# Patient Record
Sex: MALE | Race: WHITE | Employment: FULL TIME | URBAN - METROPOLITAN AREA
[De-identification: names, ages, dates, MRNs, and addresses within clinical notes are randomized per-mention and may not be internally consistent; named-entity substitution may affect disease eponyms.]

---

## 2019-02-08 ENCOUNTER — OFFICE VISIT (OUTPATIENT)
Dept: FAMILY MEDICINE CLINIC | Age: 24
End: 2019-02-08
Payer: COMMERCIAL

## 2019-02-08 VITALS
DIASTOLIC BLOOD PRESSURE: 88 MMHG | WEIGHT: 315 LBS | OXYGEN SATURATION: 96 % | HEART RATE: 78 BPM | HEIGHT: 70 IN | SYSTOLIC BLOOD PRESSURE: 138 MMHG | TEMPERATURE: 98.3 F | BODY MASS INDEX: 45.1 KG/M2

## 2019-02-08 DIAGNOSIS — G47.33 OSA (OBSTRUCTIVE SLEEP APNEA): Primary | ICD-10-CM

## 2019-02-08 PROCEDURE — 99213 OFFICE O/P EST LOW 20 MIN: CPT | Performed by: FAMILY MEDICINE

## 2019-02-08 ASSESSMENT — PATIENT HEALTH QUESTIONNAIRE - PHQ9
SUM OF ALL RESPONSES TO PHQ QUESTIONS 1-9: 0
SUM OF ALL RESPONSES TO PHQ QUESTIONS 1-9: 0
1. LITTLE INTEREST OR PLEASURE IN DOING THINGS: 0
2. FEELING DOWN, DEPRESSED OR HOPELESS: 0
SUM OF ALL RESPONSES TO PHQ9 QUESTIONS 1 & 2: 0

## 2019-08-08 ENCOUNTER — OFFICE VISIT (OUTPATIENT)
Dept: FAMILY MEDICINE CLINIC | Age: 24
End: 2019-08-08
Payer: COMMERCIAL

## 2019-08-08 ENCOUNTER — HOSPITAL ENCOUNTER (OUTPATIENT)
Age: 24
Discharge: HOME | End: 2019-08-10
Payer: COMMERCIAL

## 2019-08-08 VITALS
HEIGHT: 71 IN | SYSTOLIC BLOOD PRESSURE: 122 MMHG | HEART RATE: 76 BPM | DIASTOLIC BLOOD PRESSURE: 72 MMHG | BODY MASS INDEX: 42.28 KG/M2 | WEIGHT: 302 LBS | OXYGEN SATURATION: 98 % | TEMPERATURE: 98.7 F

## 2019-08-08 DIAGNOSIS — Z00.00 ENCOUNTER FOR WELL ADULT EXAM WITHOUT ABNORMAL FINDINGS: Primary | ICD-10-CM

## 2019-08-08 DIAGNOSIS — Z00.00 ENCOUNTER FOR WELL ADULT EXAM WITHOUT ABNORMAL FINDINGS: ICD-10-CM

## 2019-08-08 DIAGNOSIS — Z23 NEED FOR TDAP VACCINATION: ICD-10-CM

## 2019-08-08 LAB
ANION GAP SERPL CALCULATED.3IONS-SCNC: 16 MMOL/L (ref 7–16)
BASOPHILS ABSOLUTE: 0.04 E9/L (ref 0–0.2)
BASOPHILS RELATIVE PERCENT: 0.6 % (ref 0–2)
BUN BLDV-MCNC: 13 MG/DL (ref 6–20)
CALCIUM SERPL-MCNC: 10 MG/DL (ref 8.6–10.2)
CHLORIDE BLD-SCNC: 103 MMOL/L (ref 98–107)
CHOLESTEROL, TOTAL: 150 MG/DL (ref 0–199)
CO2: 23 MMOL/L (ref 22–29)
CREAT SERPL-MCNC: 0.9 MG/DL (ref 0.7–1.2)
EOSINOPHILS ABSOLUTE: 0.08 E9/L (ref 0.05–0.5)
EOSINOPHILS RELATIVE PERCENT: 1.1 % (ref 0–6)
GFR AFRICAN AMERICAN: >60
GFR NON-AFRICAN AMERICAN: >60 ML/MIN/1.73
GLUCOSE BLD-MCNC: 86 MG/DL (ref 74–99)
HCT VFR BLD CALC: 49.1 % (ref 37–54)
HDLC SERPL-MCNC: 33 MG/DL
HEMOGLOBIN: 16.1 G/DL (ref 12.5–16.5)
IMMATURE GRANULOCYTES #: 0.02 E9/L
IMMATURE GRANULOCYTES %: 0.3 % (ref 0–5)
LDL CHOLESTEROL CALCULATED: 96 MG/DL (ref 0–99)
LYMPHOCYTES ABSOLUTE: 1.59 E9/L (ref 1.5–4)
LYMPHOCYTES RELATIVE PERCENT: 21.9 % (ref 20–42)
MCH RBC QN AUTO: 29.5 PG (ref 26–35)
MCHC RBC AUTO-ENTMCNC: 32.8 % (ref 32–34.5)
MCV RBC AUTO: 89.9 FL (ref 80–99.9)
MONOCYTES ABSOLUTE: 0.7 E9/L (ref 0.1–0.95)
MONOCYTES RELATIVE PERCENT: 9.6 % (ref 2–12)
NEUTROPHILS ABSOLUTE: 4.84 E9/L (ref 1.8–7.3)
NEUTROPHILS RELATIVE PERCENT: 66.5 % (ref 43–80)
PDW BLD-RTO: 13.2 FL (ref 11.5–15)
PLATELET # BLD: 298 E9/L (ref 130–450)
PMV BLD AUTO: 8.8 FL (ref 7–12)
POTASSIUM SERPL-SCNC: 4.7 MMOL/L (ref 3.5–5)
RBC # BLD: 5.46 E12/L (ref 3.8–5.8)
SODIUM BLD-SCNC: 142 MMOL/L (ref 132–146)
TRIGL SERPL-MCNC: 107 MG/DL (ref 0–149)
VLDLC SERPL CALC-MCNC: 21 MG/DL
WBC # BLD: 7.3 E9/L (ref 4.5–11.5)

## 2019-08-08 PROCEDURE — 90715 TDAP VACCINE 7 YRS/> IM: CPT | Performed by: FAMILY MEDICINE

## 2019-08-08 PROCEDURE — 80048 BASIC METABOLIC PNL TOTAL CA: CPT

## 2019-08-08 PROCEDURE — 36415 COLL VENOUS BLD VENIPUNCTURE: CPT

## 2019-08-08 PROCEDURE — 85025 COMPLETE CBC W/AUTO DIFF WBC: CPT

## 2019-08-08 PROCEDURE — 80061 LIPID PANEL: CPT

## 2019-08-08 PROCEDURE — 99395 PREV VISIT EST AGE 18-39: CPT | Performed by: FAMILY MEDICINE

## 2019-08-08 PROCEDURE — 90471 IMMUNIZATION ADMIN: CPT | Performed by: FAMILY MEDICINE

## 2019-08-08 NOTE — PROGRESS NOTES
Tdap (age 10y-63y) IM ([facility])    As above  Patient counseled to follow up sooner or seek more acute care if symptoms worsening. Electronically signed by Geary Bence, MD on 8/9/2019    This note may have been created using dictation software.  Efforts were made to reduce grammatical or syntax errors, but some may persist.

## 2020-11-06 ENCOUNTER — OFFICE VISIT (OUTPATIENT)
Dept: FAMILY MEDICINE CLINIC | Age: 25
End: 2020-11-06
Payer: COMMERCIAL

## 2020-11-06 ENCOUNTER — TELEPHONE (OUTPATIENT)
Dept: ADMINISTRATIVE | Age: 25
End: 2020-11-06

## 2020-11-06 ENCOUNTER — NURSE ONLY (OUTPATIENT)
Dept: PRIMARY CARE CLINIC | Age: 25
End: 2020-11-06

## 2020-11-06 VITALS
TEMPERATURE: 97.8 F | DIASTOLIC BLOOD PRESSURE: 80 MMHG | OXYGEN SATURATION: 96 % | SYSTOLIC BLOOD PRESSURE: 132 MMHG | HEIGHT: 71 IN | WEIGHT: 315 LBS | BODY MASS INDEX: 44.1 KG/M2 | HEART RATE: 75 BPM

## 2020-11-06 DIAGNOSIS — J02.9 ACUTE PHARYNGITIS, UNSPECIFIED ETIOLOGY: ICD-10-CM

## 2020-11-06 PROCEDURE — 87880 STREP A ASSAY W/OPTIC: CPT | Performed by: FAMILY MEDICINE

## 2020-11-06 PROCEDURE — 99213 OFFICE O/P EST LOW 20 MIN: CPT | Performed by: FAMILY MEDICINE

## 2020-11-06 RX ORDER — PREDNISONE 50 MG/1
50 TABLET ORAL DAILY
Qty: 5 TABLET | Refills: 0 | Status: SHIPPED
Start: 2020-11-06 | End: 2021-06-26 | Stop reason: SDUPTHER

## 2020-11-06 RX ORDER — AMOXICILLIN AND CLAVULANATE POTASSIUM 875; 125 MG/1; MG/1
1 TABLET, FILM COATED ORAL 2 TIMES DAILY WITH MEALS
Qty: 20 TABLET | Refills: 0 | Status: SHIPPED | OUTPATIENT
Start: 2020-11-06 | End: 2020-11-16

## 2020-11-06 ASSESSMENT — PATIENT HEALTH QUESTIONNAIRE - PHQ9
2. FEELING DOWN, DEPRESSED OR HOPELESS: 0
SUM OF ALL RESPONSES TO PHQ9 QUESTIONS 1 & 2: 0
SUM OF ALL RESPONSES TO PHQ QUESTIONS 1-9: 0
1. LITTLE INTEREST OR PLEASURE IN DOING THINGS: 0
SUM OF ALL RESPONSES TO PHQ QUESTIONS 1-9: 0
SUM OF ALL RESPONSES TO PHQ QUESTIONS 1-9: 0

## 2020-11-06 NOTE — PROGRESS NOTES
[facility]  Office Progress Note - Dr. Amy Ontiveros  11/6/20    Chief Complaint   Patient presents with    Pharyngitis     Onset 3 days        HPI: sore throat   Hx severe strep and pharyngitis episodes in past  Required hospitalization and also intubation. Did alright last winter  Sx for 3 days, wanted to get taken care of sooner than later  No fevers, cough, ear pain, abd pain, rhinorrhea. On exam has BL tonsil enlargement but left side worse than right. No LAD BL. +PND. +exudates. Normal heart and lungs. hasnt tried tx with anything. Needs covid 19 negative to RTW.   ______________________________________________________  Family History   Problem Relation Age of Onset    Coronary Art Dis Maternal Grandfather        Past Surgical History:   Procedure Laterality Date    HAND SURGERY         Social History     Tobacco Use    Smoking status: Never Smoker    Smokeless tobacco: Never Used   Substance Use Topics    Alcohol use: Yes     Alcohol/week: 0.0 standard drinks     Comment: social    Drug use: No     ______________________________________________________  ROS: POSITIVE:sore throat   Otherwise:  No CP, No palpitations,   No sob, No cough,   No abd pain, No heartburn,   No headaches, No vision changes, No hearing changes,   No tingling, No numbness, No weakness,   No bowel changes, No hematochezia, No melena,  No bladder changes, No hematuria  No skin rashes, No skin lesions. No polyuria, polydipsia, polyphagia. Stable mood. ROS otherwise negative unless as listed in HPI.     Chart reviewed and updated where appropriate for PMH, Fam, and Soc Hx.  _______________________________________________________  Physical Exam   /80 (Site: Left Upper Arm, Position: Sitting, Cuff Size: Large Adult)   Pulse 75   Temp 97.8 °F (36.6 °C) (Temporal)   Ht 5' 11\" (1.803 m)   Wt (!) 358 lb (162.4 kg)   SpO2 96%   BMI 49.93 kg/m²   Wt Readings from Last 3 Encounters: 11/06/20 (!) 358 lb (162.4 kg)   08/08/19 (!) 302 lb (137 kg)   02/08/19 (!) 337 lb (152.9 kg)       Constitutional:    He is oriented to person, place, and time. He appears well-developed and well-nourished. HENT:    Right Ear: normal pinna, normal canal, normal TM. Left Ear: normal pinna, normal canal, normal TM. Nose: Nose normal.    Mouth/Throat: Oropharynx is clear and moist. Erythema and as in HPI. Eyes:    Conjunctivae are normal.    Pupils are equal, round, and reactive to light. EOMI. Neck:    Normal range of motion. No thyromegaly or nodules noted. No bruit. Cardiovascular:    Normal rate, regular rhythm and normal heart sounds. No murmur. No gallop and no friction rub. Pulmonary/Chest:    Effort normal and breath sounds normal.    No wheezes. No rales or rhonchi.    ________________________________________________________  No current outpatient medications on file prior to visit. No current facility-administered medications on file prior to visit. Patient Active Problem List   Diagnosis Code    Seasonal allergies J30.2    Obesity peds (BMI >=95 percentile) E66.9, Z68.54     ________________________________________________________  Assessment / Mario Seng was seen today for pharyngitis. Diagnoses and all orders for this visit:    Acute pharyngitis, unspecified etiology  -     POCT rapid strep A  -     COVID-19 Ambulatory; Future  -     amoxicillin-clavulanate (AUGMENTIN) 875-125 MG per tablet; Take 1 tablet by mouth 2 times daily (with meals) for 10 days  -     predniSONE (DELTASONE) 50 MG tablet; Take 1 tablet by mouth daily for 5 days    Strep neg in office. I am going to elect to treat given severity of previous episodes. Discussed peritonsillar abscesses and worsening signs that might suggest one as the source. Will need re-eval if Sx worsening through the weekend given historical severity.       Return if symptoms worsen or fail to improve. Patient counseled to follow up sooner or seek more acute care if symptoms worsening or not improving according to plan. .     Electronically signed by Quentin Carmen MD on 11/6/2020    This note may have been created using dictation software.  Efforts were made to reduce grammatical or syntax errors, but some may persist.

## 2020-11-08 LAB
SARS-COV-2: NOT DETECTED
SOURCE: NORMAL

## 2021-06-25 ENCOUNTER — OFFICE VISIT (OUTPATIENT)
Dept: FAMILY MEDICINE CLINIC | Age: 26
End: 2021-06-25
Payer: COMMERCIAL

## 2021-06-25 ENCOUNTER — TELEPHONE (OUTPATIENT)
Dept: FAMILY MEDICINE CLINIC | Age: 26
End: 2021-06-25

## 2021-06-25 VITALS
BODY MASS INDEX: 42.66 KG/M2 | HEART RATE: 61 BPM | HEIGHT: 72 IN | OXYGEN SATURATION: 98 % | WEIGHT: 315 LBS | TEMPERATURE: 97.1 F

## 2021-06-25 DIAGNOSIS — J02.9 SORE THROAT: Primary | ICD-10-CM

## 2021-06-25 LAB — S PYO AG THROAT QL: NORMAL

## 2021-06-25 PROCEDURE — 99213 OFFICE O/P EST LOW 20 MIN: CPT | Performed by: INTERNAL MEDICINE

## 2021-06-25 PROCEDURE — 87880 STREP A ASSAY W/OPTIC: CPT | Performed by: INTERNAL MEDICINE

## 2021-06-25 RX ORDER — AMOXICILLIN 875 MG/1
875 TABLET, COATED ORAL 2 TIMES DAILY
Qty: 20 TABLET | Refills: 0 | Status: SHIPPED | OUTPATIENT
Start: 2021-06-25 | End: 2021-07-05

## 2021-06-25 ASSESSMENT — ENCOUNTER SYMPTOMS
SINUS PRESSURE: 0
COUGH: 0
SORE THROAT: 1
SHORTNESS OF BREATH: 0
SINUS PAIN: 0
EYES NEGATIVE: 1
WHEEZING: 0
CHEST TIGHTNESS: 0

## 2021-06-25 NOTE — PROGRESS NOTES
[address] [place]     21  Letha Cure : 1995 Sex: male  Age: 26 y.o. Chief Complaint   Patient presents with    Pharyngitis     onset yesterday; woke up and it is worse this morning; left side is worse than the right; glands feel swollen       HPI    Patient presents to express care today complaining of sore throat primarily left side which started yesterday morning. States he has gotten these in the past and has had strep on a few occasions. Denies any fever or chills. States he gets this a couple times a year. Denies any other upper respiratory tract complaints including earache or sinus drainage. Denies cough. Denies shortness of breath. Denies trouble swallowing. Review of Systems   Constitutional: Negative for chills and fever. HENT: Positive for sore throat. Negative for congestion, ear pain, postnasal drip, sinus pressure and sinus pain. Eyes: Negative. Respiratory: Negative for cough, chest tightness, shortness of breath and wheezing. Cardiovascular: Negative for chest pain. REST OF PERTINENT ROS GONE OVER AND WAS NEGATIVE.              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     Past Surgical History:   Procedure Laterality Date    HAND SURGERY       Family History   Problem Relation Age of Onset    Coronary Art Dis Maternal Grandfather      Social History     Socioeconomic History    Marital status: Single     Spouse name: Not on file    Number of children: Not on file    Years of education: Not on file    Highest education level: Not on file   Occupational History    Not on file   Tobacco Use    Smoking status: Never Smoker    Smokeless tobacco: Never Used   Vaping Use  Vaping Use: Never used   Substance and Sexual Activity    Alcohol use: Yes     Alcohol/week: 0.0 standard drinks     Comment: social    Drug use: No    Sexual activity: Not on file   Other Topics Concern    Not on file   Social History Narrative    Not on file     Social Determinants of Health     Financial Resource Strain:     Difficulty of Paying Living Expenses:    Food Insecurity:     Worried About Running Out of Food in the Last Year:     [address] in the Last Year:    Transportation Needs:     Lack of Transportation (Medical):  Lack of Transportation (Non-Medical):    Physical Activity:     Days of Exercise per Week:     Minutes of Exercise per Session:    Stress:     Feeling of Stress :    Social Connections:     Frequency of Communication with Friends and Family:     Frequency of Social Gatherings with Friends and Family:     Attends [employer]oration[employer] Services:     Active Member of Clubs or Organizations:     Attends Club or Organization Meetings:     Marital Status:    Intimate Partner Violence:     Fear of Current or Ex-Partner:     Emotionally Abused:     Physically Abused:     Sexually Abused:        Vitals:    06/25/21 1242   Pulse: 61   Temp: 97.1 °F (36.2 °C)   TempSrc: Temporal   SpO2: 98%   Weight: (!) 330 lb (149.7 kg)   Height: 6' (1.829 m)       Physical Exam  Vitals and nursing note reviewed. Constitutional:       General: He is not in acute distress. Appearance: He is well-developed. HENT:      Head: Normocephalic and atraumatic. Right Ear: Tympanic membrane, ear canal and external ear normal.      Left Ear: Tympanic membrane, ear canal and external ear normal.      Nose: Nose normal.      Mouth/Throat:      Mouth: Mucous membranes are moist.      Pharynx: Oropharynx is clear. Posterior oropharyngeal erythema present. No oropharyngeal exudate.       Comments: Did have some erythema and mild peritonsillar puffiness  Cardiovascular:      Rate and Rhythm: Normal rate and regular rhythm. Heart sounds: Normal heart sounds. No murmur heard. Pulmonary:      Effort: Pulmonary effort is normal. No respiratory distress. Breath sounds: Normal breath sounds. No wheezing, rhonchi or rales. Musculoskeletal:      Cervical back: Normal range of motion and neck supple. No tenderness. Lymphadenopathy:      Cervical: No cervical adenopathy. Neurological:      Mental Status: He is alert. Psychiatric:         Mood and Affect: Mood normal.         Behavior: Behavior normal.         Thought Content: Thought content normal.         Judgment: Judgment normal.               Assessment and Plan:  Cary Marr was seen today for pharyngitis. Diagnoses and all orders for this visit:    Sore throat  -     POCT rapid strep A    Other orders  -     amoxicillin (AMOXIL) 875 MG tablet; Take 1 tablet by mouth 2 times daily for 10 days    Plan: Amoxicillin. Warned of potential side effects. Probiotic. Push fluids. Rapid strep was negative. Follow-up with PCP. Notify us if not improving. Return fu pcp. Seen By:  Skip Turner MD      *Document was created using voice recognition software. Note was reviewed however may contain grammatical errors.

## 2021-06-25 NOTE — TELEPHONE ENCOUNTER
[facility] called and said the patient told them he was supposed to receive a steroid as well as the antibiotic after his visit today. They have the antibiotic but wanted to see if  meant to send a steroid as well.  Please advise

## 2021-06-26 DIAGNOSIS — J02.9 ACUTE PHARYNGITIS, UNSPECIFIED ETIOLOGY: ICD-10-CM

## 2021-06-26 RX ORDER — PREDNISONE 50 MG/1
50 TABLET ORAL DAILY
Qty: 5 TABLET | Refills: 0 | Status: SHIPPED | OUTPATIENT
Start: 2021-06-26 | End: 2021-07-01

## 2021-09-15 ENCOUNTER — OFFICE VISIT (OUTPATIENT)
Dept: PODIATRY | Age: 26
End: 2021-09-15
Payer: COMMERCIAL

## 2021-09-15 VITALS
WEIGHT: 315 LBS | SYSTOLIC BLOOD PRESSURE: 142 MMHG | TEMPERATURE: 97.3 F | BODY MASS INDEX: 44.76 KG/M2 | DIASTOLIC BLOOD PRESSURE: 82 MMHG

## 2021-09-15 DIAGNOSIS — M25.571 ACUTE RIGHT ANKLE PAIN: ICD-10-CM

## 2021-09-15 DIAGNOSIS — M79.671 PAIN IN RIGHT FOOT: Primary | ICD-10-CM

## 2021-09-15 PROCEDURE — 99203 OFFICE O/P NEW LOW 30 MIN: CPT | Performed by: PODIATRIST

## 2021-09-15 RX ORDER — PREDNISONE 10 MG/1
10 TABLET ORAL DAILY
Qty: 18 TABLET | Refills: 1 | Status: SHIPPED | OUTPATIENT
Start: 2021-09-15 | End: 2021-09-24

## 2021-09-15 NOTE — PROGRESS NOTES
[address] [address]  Dept: [phone]  Dept Fax: [phone]    NEW PATIENT PROGRESS NOTE  Date of patient's visit: 9/15/2021  Patient's Name:  Walker Nicole YOB: 1995            Patient Care Team:  Brendan Singer MD as PCP - General (Family Medicine)  Brendan Singer MD as PCP - [facility] Empaneled Provider  Waqas Ricketts DO as Surgeon (Otolaryngology)        Chief Complaint   Patient presents with   Sujatha Claros Doctor    Foot Pain     right ankle and right foot is swollen and painful no trauma noted        HPI  HPI:   Walker Nicole is a 26 y.o. male who presents to the office today complaining of right foot and ankle pain. This new patient is seen today through our [facility] care. Patient states that several days ago he was in the woods scouting for a tree stand and he noticed that his foot started to get sores while no true trauma noted. Patient was icing and taking ibuprofen but this did not help. .  Symptoms began 3 day(s) ago. Patient relates pain is Present. Pain is rated 6 out of 10 and is described as severe. Treatments prior to today's visit include: ice. Currently denies F/C/N/V. 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  Authorizing Provider   predniSONE (DELTASONE) 10 MG tablet Take 1 tablet by mouth daily for 9 days 3 tabs  Qd x 3  days   2 tabs qd x 3 days  1 tab qd  X 3 days 9/15/21 9/24/21 Yes Dearl BRII Walker       Past Surgical History:   Procedure Laterality Date    HAND SURGERY         Family History   Problem Relation Age of Onset    Coronary Art Dis Maternal Grandfather Social History     Tobacco Use    Smoking status: Never Smoker    Smokeless tobacco: Never Used   Substance Use Topics    Alcohol use: Yes     Alcohol/week: 0.0 standard drinks     Comment: social       Review of Systems    Review of Systems:   History obtained from chart review and the patient  General ROS: negative for - chills, fatigue, fever, night sweats or weight gain  Constitutional: Negative for chills, diaphoresis, fatigue, fever and unexpected weight change. Musculoskeletal: Positive for pain. Neurological ROS: negative for - behavioral changes, confusion, headaches or seizures. Negative for weakness and numbness. Dermatological ROS: negative for - mole changes, rash  Cardiovascular: Negative for leg swelling. Gastrointestinal: Negative for constipation, diarrhea, nausea and vomiting. Lower Extremity Physical Examination:   Vitals:   Vitals:    09/15/21 1100   BP: (!) 142/82   Temp: 97.3 °F (36.3 °C)        Foot Exam    General  General Appearance: appears stated age and healthy   Orientation: alert and oriented to person, place, and time       Right Foot/Ankle     Inspection and Palpation  Skin Exam: skin intact; Neurovascular  Dorsalis pedis: 3+  Posterior tibial: 3+  Saphenous nerve sensation: normal  Tibial nerve sensation: normal  Superficial peroneal nerve sensation: normal  Deep peroneal nerve sensation: normal  Sural nerve sensation: normal  Achilles reflex: 2+  Babinski reflex: 2+    Range of Motion    Normal right ankle ROM  Passive  Ankle dorsiflexion: pain  Ankle eversion: pain  Ankle inversion: pain            Right Ankle Exam   Swelling: mild    Range of Motion   The patient has normal right ankle ROM. Muscle Strength   The patient has normal right ankle strength. General: AAO x 3 in NAD. Dermatologic Exam:  Skin lesion/ulceration Absent . Skin No rashes or nodules noted. .   Musculoskeletal:   Pain with palpation directly at the right ankle tibialis anterior tendon. Muscle strength was within normal limits. 1st MPJ ROM within normal limits, Right. Ankle ROM within normal limits,Bilateral.   HEEL PAIN      TARSAL TUNNEL PAIN      Vascular:     Radiographs:  3 views 3 views of the right ankle and foot do not show any fractures mild arthritis under the subtalar joint and tibiotalar joint. Foot/ankle:     Asessment: Patient is a 26 y.o. male with:    Diagnosis Orders   1. Pain in right foot  XR ANKLE RIGHT (MIN 3 VIEWS)    XR FOOT RIGHT (MIN 3 VIEWS)   2. Acute right ankle pain  XR ANKLE RIGHT (MIN 3 VIEWS)    XR FOOT RIGHT (MIN 3 VIEWS)       Plan: Patient examined and evaluated. Current condition and treatment options discussed in detail. Discussed conservative and surgical options with the patient. Treatment options today consisted of verbal and written instructions given to patient. Contact office with any questions/problems/concerns. RTC in 2week(s)   patient was placed in a cam walker on prednisone no work or standing for 2 weeks. Cam Walker  Signed informed consent was obtained from the patient. Patient applied the cam walker to the affected limb. This device fit well. Patient was given written and verbal instructions regarding this cam walker. This is medically necessary to help reduce pain and promote and expedite healing.   .    9/15/2021    Electronically signed by Maribel Torrez DPM on 9/15/2021 at 11:36 AM  9/15/2021

## 2021-09-15 NOTE — LETTER
[address]  [address]   Phone: [phone]  Fax: Margarita Rodgers [address]        September 15, 2021     Patient: Elvis Sullivan   YOB: 1995   Date of Visit: 9/15/2021       To Whom it May Concern:    Dayanara Laughlin was seen in my clinic on 9/15/2021. He cannot return to work at this time due to injury to his right ankle will be reevaluated on 10/6/2021 will decide if he can return to work at that appointment     If you have any questions or concerns, please don't hesitate to call.     Sincerely,         Hood Urias DPM

## 2025-07-07 ENCOUNTER — HOSPITAL ENCOUNTER (EMERGENCY)
Age: 30
Discharge: HOME | End: 2025-07-08
Attending: EMERGENCY MEDICINE
Payer: COMMERCIAL

## 2025-07-07 DIAGNOSIS — J36 TONSIL, ABSCESS: Primary | ICD-10-CM

## 2025-07-07 LAB
SPECIMEN SOURCE: NORMAL
STREP A, MOLECULAR: NEGATIVE

## 2025-07-07 PROCEDURE — 42700 I&D ABSCESS PERITONSILLAR: CPT

## 2025-07-07 PROCEDURE — 83605 ASSAY OF LACTIC ACID: CPT

## 2025-07-07 PROCEDURE — 2580000003 HC RX 258: Performed by: NURSE PRACTITIONER

## 2025-07-07 PROCEDURE — 99285 EMERGENCY DEPT VISIT HI MDM: CPT

## 2025-07-07 PROCEDURE — 80053 COMPREHEN METABOLIC PANEL: CPT

## 2025-07-07 PROCEDURE — 6360000002 HC RX W HCPCS: Performed by: NURSE PRACTITIONER

## 2025-07-07 PROCEDURE — 87651 STREP A DNA AMP PROBE: CPT

## 2025-07-07 PROCEDURE — 85025 COMPLETE CBC W/AUTO DIFF WBC: CPT

## 2025-07-07 PROCEDURE — 96375 TX/PRO/DX INJ NEW DRUG ADDON: CPT

## 2025-07-07 PROCEDURE — 2500000003 HC RX 250 WO HCPCS: Performed by: NURSE PRACTITIONER

## 2025-07-07 RX ORDER — DEXAMETHASONE SODIUM PHOSPHATE 10 MG/ML
10 INJECTION, SOLUTION INTRA-ARTICULAR; INTRALESIONAL; INTRAMUSCULAR; INTRAVENOUS; SOFT TISSUE ONCE
Status: COMPLETED | OUTPATIENT
Start: 2025-07-07 | End: 2025-07-07

## 2025-07-07 RX ORDER — KETOROLAC TROMETHAMINE 30 MG/ML
30 INJECTION, SOLUTION INTRAMUSCULAR; INTRAVENOUS ONCE
Status: COMPLETED | OUTPATIENT
Start: 2025-07-07 | End: 2025-07-07

## 2025-07-07 RX ORDER — 0.9 % SODIUM CHLORIDE 0.9 %
1000 INTRAVENOUS SOLUTION INTRAVENOUS ONCE
Status: COMPLETED | OUTPATIENT
Start: 2025-07-07 | End: 2025-07-08

## 2025-07-07 RX ADMIN — SODIUM CHLORIDE 1000 ML: 0.9 INJECTION, SOLUTION INTRAVENOUS at 23:56

## 2025-07-07 RX ADMIN — KETOROLAC TROMETHAMINE 30 MG: 30 INJECTION, SOLUTION INTRAMUSCULAR at 23:55

## 2025-07-07 RX ADMIN — FAMOTIDINE 20 MG: 10 INJECTION, SOLUTION INTRAVENOUS at 23:55

## 2025-07-07 RX ADMIN — DEXAMETHASONE SODIUM PHOSPHATE 10 MG: 10 INJECTION INTRAMUSCULAR; INTRAVENOUS at 23:55

## 2025-07-07 ASSESSMENT — PAIN - FUNCTIONAL ASSESSMENT: PAIN_FUNCTIONAL_ASSESSMENT: 0-10

## 2025-07-07 ASSESSMENT — PAIN DESCRIPTION - ONSET: ONSET: ON-GOING

## 2025-07-07 ASSESSMENT — PAIN DESCRIPTION - PAIN TYPE: TYPE: ACUTE PAIN

## 2025-07-07 ASSESSMENT — PAIN DESCRIPTION - FREQUENCY: FREQUENCY: CONTINUOUS

## 2025-07-07 ASSESSMENT — PAIN DESCRIPTION - LOCATION: LOCATION: THROAT

## 2025-07-07 ASSESSMENT — PAIN SCALES - GENERAL: PAINLEVEL_OUTOF10: 10

## 2025-07-07 ASSESSMENT — PAIN DESCRIPTION - DESCRIPTORS: DESCRIPTORS: ACHING

## 2025-07-08 ENCOUNTER — TELEPHONE (OUTPATIENT)
Dept: ENT CLINIC | Age: 30
End: 2025-07-08

## 2025-07-08 ENCOUNTER — APPOINTMENT (OUTPATIENT)
Dept: CT IMAGING | Age: 30
End: 2025-07-08
Payer: COMMERCIAL

## 2025-07-08 VITALS
HEIGHT: 72 IN | HEART RATE: 78 BPM | WEIGHT: 270 LBS | OXYGEN SATURATION: 98 % | SYSTOLIC BLOOD PRESSURE: 162 MMHG | TEMPERATURE: 99.2 F | DIASTOLIC BLOOD PRESSURE: 70 MMHG | BODY MASS INDEX: 36.57 KG/M2 | RESPIRATION RATE: 16 BRPM

## 2025-07-08 LAB
ALBUMIN SERPL-MCNC: 4.6 G/DL (ref 3.5–5.2)
ALP SERPL-CCNC: 68 U/L (ref 40–129)
ALT SERPL-CCNC: 17 U/L (ref 0–50)
ANION GAP SERPL CALCULATED.3IONS-SCNC: 13 MMOL/L (ref 7–16)
AST SERPL-CCNC: 18 U/L (ref 0–50)
BASOPHILS # BLD: 0.04 K/UL (ref 0–0.2)
BASOPHILS NFR BLD: 0 % (ref 0–2)
BILIRUB SERPL-MCNC: 1 MG/DL (ref 0–1.2)
BUN SERPL-MCNC: 12 MG/DL (ref 6–20)
CALCIUM SERPL-MCNC: 9.7 MG/DL (ref 8.6–10)
CHLORIDE SERPL-SCNC: 103 MMOL/L (ref 98–107)
CO2 SERPL-SCNC: 26 MMOL/L (ref 22–29)
CREAT SERPL-MCNC: 1 MG/DL (ref 0.7–1.2)
EOSINOPHIL # BLD: 0.04 K/UL (ref 0.05–0.5)
EOSINOPHILS RELATIVE PERCENT: 0 % (ref 0–6)
ERYTHROCYTE [DISTWIDTH] IN BLOOD BY AUTOMATED COUNT: 12.6 % (ref 11.5–15)
GFR, ESTIMATED: >90 ML/MIN/1.73M2
GLUCOSE SERPL-MCNC: 99 MG/DL (ref 74–99)
HCT VFR BLD AUTO: 43.9 % (ref 37–54)
HGB BLD-MCNC: 15 G/DL (ref 12.5–16.5)
IMM GRANULOCYTES # BLD AUTO: 0.04 K/UL (ref 0–0.58)
IMM GRANULOCYTES NFR BLD: 0 % (ref 0–5)
LACTATE BLDV-SCNC: 0.8 MMOL/L (ref 0.5–2.2)
LYMPHOCYTES NFR BLD: 1.22 K/UL (ref 1.5–4)
LYMPHOCYTES RELATIVE PERCENT: 10 % (ref 20–42)
MCH RBC QN AUTO: 30.7 PG (ref 26–35)
MCHC RBC AUTO-ENTMCNC: 34.2 G/DL (ref 32–34.5)
MCV RBC AUTO: 89.8 FL (ref 80–99.9)
MONOCYTES NFR BLD: 0.92 K/UL (ref 0.1–0.95)
MONOCYTES NFR BLD: 8 % (ref 2–12)
NEUTROPHILS NFR BLD: 81 % (ref 43–80)
NEUTS SEG NFR BLD: 9.42 K/UL (ref 1.8–7.3)
PLATELET # BLD AUTO: 240 K/UL (ref 130–450)
PMV BLD AUTO: 8.6 FL (ref 7–12)
POTASSIUM SERPL-SCNC: 4.1 MMOL/L (ref 3.5–5.1)
PROT SERPL-MCNC: 7.6 G/DL (ref 6.4–8.3)
RBC # BLD AUTO: 4.89 M/UL (ref 3.8–5.8)
SODIUM SERPL-SCNC: 142 MMOL/L (ref 136–145)
WBC OTHER # BLD: 11.7 K/UL (ref 4.5–11.5)

## 2025-07-08 PROCEDURE — 6360000004 HC RX CONTRAST MEDICATION: Performed by: RADIOLOGY

## 2025-07-08 PROCEDURE — 96365 THER/PROPH/DIAG IV INF INIT: CPT

## 2025-07-08 PROCEDURE — 2580000003 HC RX 258: Performed by: NURSE PRACTITIONER

## 2025-07-08 PROCEDURE — 6360000002 HC RX W HCPCS: Performed by: EMERGENCY MEDICINE

## 2025-07-08 PROCEDURE — 2580000003 HC RX 258: Performed by: EMERGENCY MEDICINE

## 2025-07-08 PROCEDURE — 96366 THER/PROPH/DIAG IV INF ADDON: CPT

## 2025-07-08 PROCEDURE — 6360000002 HC RX W HCPCS: Performed by: NURSE PRACTITIONER

## 2025-07-08 PROCEDURE — 70491 CT SOFT TISSUE NECK W/DYE: CPT

## 2025-07-08 RX ORDER — IOPAMIDOL 755 MG/ML
75 INJECTION, SOLUTION INTRAVASCULAR
Status: COMPLETED | OUTPATIENT
Start: 2025-07-08 | End: 2025-07-08

## 2025-07-08 RX ORDER — LIDOCAINE HYDROCHLORIDE AND EPINEPHRINE 10; 10 MG/ML; UG/ML
INJECTION, SOLUTION INFILTRATION; PERINEURAL
Status: DISCONTINUED
Start: 2025-07-08 | End: 2025-07-08 | Stop reason: HOSPADM

## 2025-07-08 RX ORDER — PREDNISONE 20 MG/1
20 TABLET ORAL 2 TIMES DAILY
Qty: 10 TABLET | Refills: 0 | Status: SHIPPED | OUTPATIENT
Start: 2025-07-08 | End: 2025-07-13

## 2025-07-08 RX ORDER — DEXAMETHASONE SODIUM PHOSPHATE 10 MG/ML
10 INJECTION, SOLUTION INTRA-ARTICULAR; INTRALESIONAL; INTRAMUSCULAR; INTRAVENOUS; SOFT TISSUE EVERY 8 HOURS
Status: DISCONTINUED | OUTPATIENT
Start: 2025-07-08 | End: 2025-07-08 | Stop reason: HOSPADM

## 2025-07-08 RX ADMIN — IOPAMIDOL 75 ML: 755 INJECTION, SOLUTION INTRAVENOUS at 00:58

## 2025-07-08 RX ADMIN — SODIUM CHLORIDE 3000 MG: 9 INJECTION, SOLUTION INTRAVENOUS at 06:44

## 2025-07-08 RX ADMIN — DEXAMETHASONE SODIUM PHOSPHATE 10 MG: 10 INJECTION INTRAMUSCULAR; INTRAVENOUS at 06:05

## 2025-07-08 RX ADMIN — SODIUM CHLORIDE 3000 MG: 9 INJECTION, SOLUTION INTRAVENOUS at 00:00

## 2025-07-08 ASSESSMENT — ENCOUNTER SYMPTOMS
VOICE CHANGE: 1
ABDOMINAL PAIN: 0
EYE REDNESS: 0
SHORTNESS OF BREATH: 0
NAUSEA: 0
VOMITING: 0
TROUBLE SWALLOWING: 1
SORE THROAT: 1

## 2025-07-08 NOTE — ED TRIAGE NOTES
Department of Emergency Medicine  FIRST PROVIDER TRIAGE NOTE             Independent MLP           7/7/25  9:17 PM EDT    Date of Encounter: 7/7/25   MRN: [id number]      HPI: Kodak Schulz is a 29 y.o. male who presents to the ED for Oral Swelling   Painful to open mouth and sore throat no difficulty breathing hx of strep     ROS: Negative for abd pain or back pain.    PE: Gen Appearance/Constitutional: alert  HEENT: NC/NT. PERRLA,  Airway patent.  Neck: supple  CV: regular rate  Pulm: CTA bilat  GI: soft and NT  Musculoskeletal: moves all extremities x 4  Lymphatics: no edema     Initial Plan of Care: All treatment areas with department are currently occupied. Plan to order/Initiate the following while awaiting opening in ED: labs, imaging studies, antipyretics, analgesics, antibiotics, and IV fluids.  Initiate Treatment-Testing, Proceed toTreatment Area When Bed Available for ED Attending/MLP to Continue Care    Electronically signed by KEANU Juárez - JOHN   DD: 7/7/25

## 2025-07-08 NOTE — ED PROVIDER NOTES
nonspecific cyst but small abscess is possible.   However, in the rest of left palatine tonsil there is no obvious inflammatory   change.   6. Mild reactive lymphadenopathy in bilateral submandibular areas and   jugulodigastric areas.           CT SOFT TISSUE NECK W CONTRAST  Result Date: 7/8/2025  1. Acute right palatine tonsillitis with inter tonsillar abscess or developing abscess in the midportion of the right palatine tonsil measuring 1.3 x 2.0 x 1.14 cm. 2. The enlarged right palatine tonsil bulges to midline. 3. The right aryepiglottic fold is edematous and thickened. 4. The right pyriform sinus is markedly narrowed due to edema and soft tissue swelling involving the anterior portion of the right aryepiglottic fold. 5. The left palatine tonsil is mildly enlarged. In the posteroinferior portion of left palatine tonsil there is a small hypodense focus measuring 0.78 cm in diameter probably nonspecific cyst but small abscess is possible. However, in the rest of left palatine tonsil there is no obvious inflammatory change. 6. Mild reactive lymphadenopathy in bilateral submandibular areas and jugulodigastric areas.       No results found.    PROCEDURES   Unless otherwise noted below, none       MDM:   Dr. Kieran MCCOY am the primary physician of record.    Kodak Schulz is a 29 y.o. male who presents to the ED for sore throat  Vital signs upon arrival BP (!) 150/79   Pulse 98   Temp 99.2 °F (37.3 °C) (Oral)   Resp 16   Ht 1.829 m (6')   Wt 122.5 kg (270 lb)   SpO2 100%   BMI 36.62 kg/m²     History/physical examination/differential diagnosis/Labs and imaging  Patient presents emergency department the chief complaint of sore throat.  Patient sitting in the bed no acute distress, heart regular rate and rhythm, lungs clear to auscultation bilaterally there is deviation of the uvula to the left, prominence of the right, abdomen is soft, nontender no rebound, rigidity or guarding.  Differential diagnose

## 2025-07-08 NOTE — ED NOTES
Upon reevaluation, patient is resting comfortably, airway patent, no drooling, no hot potato voice.  Tolerated p.o.  States he feels significantly better.  In consultation with ENT resident, patient is stable to go home and follow-up with ENT in 1 week.  He has seen Dr. Maharaj in the past.  Will be discharged home on Augmentin and prednisone as per ENT recommendations.  Patient is very happy and agreeable with this plan.     --------------------------------- IMPRESSION AND DISPOSITION ---------------------------------    IMPRESSION  1. Tonsil, abscess        DISPOSITION  Disposition: Discharge to home  Patient condition is stable       Ariana Morgan,   07/08/25 1135

## 2025-07-08 NOTE — CONSULTS
OTOLARYNGOLOGY  CONSULT NOTE  7/8/2025    Physician Consulted: Dr. Holland  Reason for Consult: Tonsillar abscess       HPI  Kodak Schulz is a 29 y.o. male who ENT was consulted for evaluation of tonsillar abscess.  Patient's his previous history of ICU admission for multiple days secondary to related symptoms (believed tonsillitis, denied ever having a drain before) approximately 4 years ago.  Patient denied being on antibiotics prior to presentation.  Symptoms started 3 days ago and have been progressing since then and include sore throat worse on the right, muffled voice, difficulty swallowing with progression to not swallowing saliva prior to presentation.  Patient presented to the emergency department for any symptoms and was given IV Unasyn and IV Decadron which had improved his symptoms at the time of evaluation.  Patient admits to tobacco use daily and heavy alcohol use once a week.  Denied fevers and chills, denied difficulty breathing.    Review of Systems   Constitutional:  Negative for chills and fever.   HENT:  Positive for sore throat, trouble swallowing and voice change. Negative for dental problem.    Respiratory:  Negative for shortness of breath.    All other systems reviewed and are negative.      Past Medical History:   Diagnosis Date    Asthma     sports induced    Benign tumor of bones of wrist and hand age 15    Right Hand       Past Surgical History:   Procedure Laterality Date    HAND SURGERY         Medications Prior to Admission:    Prior to Admission medications    Not on File       No Known Allergies    Family History   Problem Relation Age of Onset    Coronary Art Dis Maternal Grandfather        Social History     Tobacco Use    Smoking status: Never    Smokeless tobacco: Never   Vaping Use    Vaping status: Never Used   Substance Use Topics    Alcohol use: Yes     Alcohol/week: 0.0 standard drinks of alcohol     Comment: social    Drug use: No           PHYSICAL

## 2025-07-16 ENCOUNTER — OFFICE VISIT (OUTPATIENT)
Dept: ENT CLINIC | Age: 30
End: 2025-07-16
Payer: COMMERCIAL

## 2025-07-16 VITALS
OXYGEN SATURATION: 98 % | HEIGHT: 72 IN | BODY MASS INDEX: 36.03 KG/M2 | WEIGHT: 266 LBS | HEART RATE: 67 BPM | DIASTOLIC BLOOD PRESSURE: 76 MMHG | TEMPERATURE: 98 F | SYSTOLIC BLOOD PRESSURE: 126 MMHG

## 2025-07-16 DIAGNOSIS — J36 PERITONSILLAR ABSCESS: Primary | ICD-10-CM

## 2025-07-16 DIAGNOSIS — J02.0 RECURRENT STREPTOCOCCAL PHARYNGITIS: ICD-10-CM

## 2025-07-16 DIAGNOSIS — J35.1 TONSILLAR HYPERTROPHY: ICD-10-CM

## 2025-07-16 PROCEDURE — 99213 OFFICE O/P EST LOW 20 MIN: CPT | Performed by: NURSE PRACTITIONER

## 2025-07-16 ASSESSMENT — ENCOUNTER SYMPTOMS
SHORTNESS OF BREATH: 0
SINUS PAIN: 0
SORE THROAT: 0
RESPIRATORY NEGATIVE: 1
STRIDOR: 0
TROUBLE SWALLOWING: 0
VOICE CHANGE: 0
SINUS PRESSURE: 0
RHINORRHEA: 1
EYES NEGATIVE: 1

## 2025-07-16 NOTE — PROGRESS NOTES
DEPARTMENT OF OTOLARYNGOLOGY  SATCEY ConnellO., Ms. Ed.  STACEY BelloO.  Tung Barry, MSN, FNP-C        Patient Name:  Kodak Schulz  :  1995     CHIEF C/O:    Chief Complaint   Patient presents with    Follow-up     FOLLOW UP - ED follow up tonsil abscess (lm for pt colleen. 7/15 SJ)           HISTORY OBTAINED FROM:  patient    HISTORY OF PRESENT ILLNESS:       Kodak is a 29 y.o. year old male, here today for follow up of:       History of Present Illness  The patient presents for evaluation of a peritonsillar abscess.    He was previously treated in the ER for a tonsillar abscess, which was drained. He experienced right-sided throat pain, difficulty swallowing, and a hot potato voice. He was prescribed Augmentin, which he has been taking for the past 5 days. Since starting the antibiotics, his condition has improved. He reports no current difficulty swallowing, sore throat, ear pain or pressure, congestion, runny nose, postnasal drainage, sinus pain or pressure. He has a history of allergies and sinus issues but does not take any medication for them.     The patient recalls a similar episode 8 years ago, which resolved more quickly than the current one. At that time, he was using Chloraseptic without understanding the correct dosage, which led to adverse effects. He was then prescribed Flonase, which he used for about 6 months before discontinuing it. He also mentions that he sleeps with his mouth open. He has a history of frequent strep throat infections.        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